# Patient Record
Sex: MALE | Race: WHITE | NOT HISPANIC OR LATINO | Employment: OTHER | URBAN - METROPOLITAN AREA
[De-identification: names, ages, dates, MRNs, and addresses within clinical notes are randomized per-mention and may not be internally consistent; named-entity substitution may affect disease eponyms.]

---

## 2017-12-27 ENCOUNTER — ESTABLISHED COMPREHENSIVE EXAM (OUTPATIENT)
Dept: URBAN - METROPOLITAN AREA CLINIC 46 | Facility: CLINIC | Age: 52
End: 2017-12-27

## 2017-12-27 DIAGNOSIS — H52.7: ICD-10-CM

## 2017-12-27 DIAGNOSIS — Z79.4: ICD-10-CM

## 2017-12-27 DIAGNOSIS — E11.9: ICD-10-CM

## 2017-12-27 PROCEDURE — 92014 COMPRE OPH EXAM EST PT 1/>: CPT

## 2017-12-27 PROCEDURE — 92015 DETERMINE REFRACTIVE STATE: CPT

## 2017-12-27 ASSESSMENT — VISUAL ACUITY
OS_CC: J3
OS_SC: J3
OD_CC: 20/20-2
OD_SC: J5
OD_CC: J2
OD_SC: 20/25+2
OS_SC: 20/20-1
OS_CC: 20/20-2

## 2017-12-27 ASSESSMENT — TONOMETRY
OD_IOP_MMHG: 18
OS_IOP_MMHG: 19

## 2018-12-19 ENCOUNTER — ESTABLISHED COMPREHENSIVE EXAM (OUTPATIENT)
Dept: URBAN - METROPOLITAN AREA CLINIC 46 | Facility: CLINIC | Age: 53
End: 2018-12-19

## 2018-12-19 ENCOUNTER — PREPPED CHART (OUTPATIENT)
Dept: URBAN - METROPOLITAN AREA CLINIC 46 | Facility: CLINIC | Age: 53
End: 2018-12-19

## 2018-12-19 DIAGNOSIS — E11.9: ICD-10-CM

## 2018-12-19 DIAGNOSIS — H52.7: ICD-10-CM

## 2018-12-19 PROCEDURE — 92015 DETERMINE REFRACTIVE STATE: CPT

## 2018-12-19 PROCEDURE — 92014 COMPRE OPH EXAM EST PT 1/>: CPT

## 2018-12-19 ASSESSMENT — VISUAL ACUITY
OD_SC: 20/20-1
OD_CC: 20/20
OS_SC: 20/20
OD_CC: J1
OS_CC: 20/20
OS_SC: J5
OS_CC: J2
OD_SC: J3

## 2018-12-19 ASSESSMENT — TONOMETRY
OD_IOP_MMHG: 17
OS_IOP_MMHG: 19

## 2019-08-19 ENCOUNTER — ESTABLISHED COMPREHENSIVE EXAM (OUTPATIENT)
Dept: URBAN - METROPOLITAN AREA CLINIC 46 | Facility: CLINIC | Age: 54
End: 2019-08-19

## 2019-08-19 DIAGNOSIS — H52.7: ICD-10-CM

## 2019-08-19 DIAGNOSIS — H40.033: ICD-10-CM

## 2019-08-19 DIAGNOSIS — Z79.4: ICD-10-CM

## 2019-08-19 DIAGNOSIS — E11.3293: ICD-10-CM

## 2019-08-19 PROCEDURE — 92015 DETERMINE REFRACTIVE STATE: CPT

## 2019-08-19 PROCEDURE — 92014 COMPRE OPH EXAM EST PT 1/>: CPT

## 2019-08-19 PROCEDURE — 92132 CPTRZD OPH DX IMG ANT SGM: CPT

## 2019-08-19 ASSESSMENT — VISUAL ACUITY
OS_CC: J2
OS_CC: 20/20-1
OD_CC: 20/20-1
OD_CC: J1+

## 2019-08-19 ASSESSMENT — TONOMETRY
OS_IOP_MMHG: 16
OD_IOP_MMHG: 14

## 2020-07-20 ENCOUNTER — ESTABLISHED COMPREHENSIVE EXAM (OUTPATIENT)
Dept: URBAN - METROPOLITAN AREA CLINIC 46 | Facility: CLINIC | Age: 55
End: 2020-07-20

## 2020-07-20 DIAGNOSIS — Z79.4: ICD-10-CM

## 2020-07-20 DIAGNOSIS — H40.033: ICD-10-CM

## 2020-07-20 DIAGNOSIS — E11.3293: ICD-10-CM

## 2020-07-20 DIAGNOSIS — H52.7: ICD-10-CM

## 2020-07-20 PROCEDURE — 92132 CPTRZD OPH DX IMG ANT SGM: CPT

## 2020-07-20 PROCEDURE — 92015 DETERMINE REFRACTIVE STATE: CPT

## 2020-07-20 PROCEDURE — 92014 COMPRE OPH EXAM EST PT 1/>: CPT

## 2020-07-20 ASSESSMENT — VISUAL ACUITY
OS_CC: 20/20 BLURRY
OS_CC: J2
OD_CC: J1
OD_CC: 20/20-1

## 2020-07-20 ASSESSMENT — TONOMETRY
OS_IOP_MMHG: 15
OD_IOP_MMHG: 15

## 2021-07-30 ENCOUNTER — ESTABLISHED COMPREHENSIVE EXAM (OUTPATIENT)
Dept: URBAN - METROPOLITAN AREA CLINIC 47 | Facility: CLINIC | Age: 56
End: 2021-07-30

## 2021-07-30 DIAGNOSIS — Z79.4: ICD-10-CM

## 2021-07-30 DIAGNOSIS — H40.033: ICD-10-CM

## 2021-07-30 DIAGNOSIS — H52.7: ICD-10-CM

## 2021-07-30 DIAGNOSIS — E11.3293: ICD-10-CM

## 2021-07-30 DIAGNOSIS — H50.51: ICD-10-CM

## 2021-07-30 PROCEDURE — 92132 CPTRZD OPH DX IMG ANT SGM: CPT

## 2021-07-30 PROCEDURE — 92014 COMPRE OPH EXAM EST PT 1/>: CPT

## 2021-07-30 PROCEDURE — 92015 DETERMINE REFRACTIVE STATE: CPT

## 2021-07-30 ASSESSMENT — VISUAL ACUITY
OS_CC: 20/20
OS_CC: J3
OD_SC: 20/20
OD_CC: J1
OS_SC: 20/20
OD_CC: 20/20

## 2021-07-30 ASSESSMENT — TONOMETRY
OD_IOP_MMHG: 15
OS_IOP_MMHG: 16

## 2022-08-17 ENCOUNTER — COMPREHENSIVE EXAM (OUTPATIENT)
Dept: URBAN - METROPOLITAN AREA CLINIC 46 | Facility: CLINIC | Age: 57
End: 2022-08-17

## 2022-08-17 DIAGNOSIS — H50.51: ICD-10-CM

## 2022-08-17 DIAGNOSIS — H52.7: ICD-10-CM

## 2022-08-17 DIAGNOSIS — Z79.4: ICD-10-CM

## 2022-08-17 DIAGNOSIS — H40.033: ICD-10-CM

## 2022-08-17 DIAGNOSIS — H35.371: ICD-10-CM

## 2022-08-17 DIAGNOSIS — H25.813: ICD-10-CM

## 2022-08-17 DIAGNOSIS — E11.3392: ICD-10-CM

## 2022-08-17 DIAGNOSIS — E11.3291: ICD-10-CM

## 2022-08-17 PROCEDURE — 92014 COMPRE OPH EXAM EST PT 1/>: CPT

## 2022-08-17 PROCEDURE — 92015 DETERMINE REFRACTIVE STATE: CPT

## 2022-08-17 PROCEDURE — 92132 CPTRZD OPH DX IMG ANT SGM: CPT

## 2022-08-17 PROCEDURE — 92134 CPTRZ OPH DX IMG PST SGM RTA: CPT

## 2022-08-17 ASSESSMENT — TONOMETRY
OS_IOP_MMHG: 18
OD_IOP_MMHG: 18

## 2022-08-17 ASSESSMENT — VISUAL ACUITY
OD_SC: 20/25-2
OS_CC: 20/25
OS_SC: J5
OD_CC: 20/20-1
OD_SC: J5
OS_CC: J3
OD_CC: J1
OS_SC: 20/25

## 2023-01-09 ENCOUNTER — FOLLOW UP (OUTPATIENT)
Dept: URBAN - METROPOLITAN AREA CLINIC 46 | Facility: CLINIC | Age: 58
End: 2023-01-09

## 2023-01-09 DIAGNOSIS — H35.371: ICD-10-CM

## 2023-01-09 DIAGNOSIS — E11.3291: ICD-10-CM

## 2023-01-09 DIAGNOSIS — E11.3392: ICD-10-CM

## 2023-01-09 DIAGNOSIS — H25.813: ICD-10-CM

## 2023-01-09 DIAGNOSIS — H40.033: ICD-10-CM

## 2023-01-09 DIAGNOSIS — H52.7: ICD-10-CM

## 2023-01-09 DIAGNOSIS — Z79.4: ICD-10-CM

## 2023-01-09 PROCEDURE — 92020 GONIOSCOPY: CPT

## 2023-01-09 PROCEDURE — 92012 INTRM OPH EXAM EST PATIENT: CPT

## 2023-01-09 ASSESSMENT — VISUAL ACUITY
OD_SC: 20/20-1
OS_SC: J5
OD_SC: J5
OS_SC: 20/20-1

## 2024-03-18 ENCOUNTER — COMPREHENSIVE EXAM (OUTPATIENT)
Dept: URBAN - METROPOLITAN AREA CLINIC 46 | Facility: CLINIC | Age: 59
End: 2024-03-18

## 2024-03-18 DIAGNOSIS — H40.033: ICD-10-CM

## 2024-03-18 DIAGNOSIS — E11.3393: ICD-10-CM

## 2024-03-18 DIAGNOSIS — H40.013: ICD-10-CM

## 2024-03-18 DIAGNOSIS — H52.7: ICD-10-CM

## 2024-03-18 DIAGNOSIS — H25.813: ICD-10-CM

## 2024-03-18 DIAGNOSIS — Z79.4: ICD-10-CM

## 2024-03-18 DIAGNOSIS — H35.371: ICD-10-CM

## 2024-03-18 PROCEDURE — 92014 COMPRE OPH EXAM EST PT 1/>: CPT

## 2024-03-18 PROCEDURE — 92015 DETERMINE REFRACTIVE STATE: CPT

## 2024-03-18 ASSESSMENT — TONOMETRY
OD_IOP_MMHG: 16
OS_IOP_MMHG: 15

## 2024-03-18 ASSESSMENT — VISUAL ACUITY
OD_SC: J6
OD_SC: 20/15
OU_SC: 20/15
OU_SC: J3
OU_CC: J1+
OD_CC: J1+
OS_SC: 20/20-1
OS_CC: J1+
OS_SC: J6

## 2024-12-11 ENCOUNTER — COMPREHENSIVE EXAM (OUTPATIENT)
Age: 59
End: 2024-12-11

## 2024-12-11 DIAGNOSIS — H40.013: ICD-10-CM

## 2024-12-11 DIAGNOSIS — H35.371: ICD-10-CM

## 2024-12-11 DIAGNOSIS — H25.813: ICD-10-CM

## 2024-12-11 DIAGNOSIS — Z79.4: ICD-10-CM

## 2024-12-11 DIAGNOSIS — H52.7: ICD-10-CM

## 2024-12-11 DIAGNOSIS — E11.3393: ICD-10-CM

## 2024-12-11 DIAGNOSIS — H40.033: ICD-10-CM

## 2024-12-11 PROCEDURE — 92134 CPTRZ OPH DX IMG PST SGM RTA: CPT

## 2024-12-11 PROCEDURE — 92014 COMPRE OPH EXAM EST PT 1/>: CPT

## 2025-01-04 NOTE — PATIENT DISCUSSION
12/20/2017OSplano+[id number]+[id number]/50 -2&nbsp;SN &nbsp; &nbsp; mnp
AMD (DRY), OU__:  PRESCRIBE AREDS 2 VITAMINS / AMSLER GRID QD/ UV PROTECTION. SMOKING CESSATION EMPHASIZED. RETURN FOR FOLLOW-UP AS SCHEDULED.
AMD (Dry) Counseling:  I have instructed the patient to take an AREDS 2 vitamin mixture to minimize the risk of disease progression. The importance of daily monitoring with Amsler grid was emphasized and an Amsler grid was provided if the patient did not have one. Return for follow-up as scheduled.
Bifocal - Daily wearOD-0.25+1.0015+[id number]/30 -2&nbsp;SN &nbsp; &nbsp; mnp
COUNSELING:
Continue: Artificial Tears (dextran 70-hypromellose): drops: 0.1-0.3%
General:
Glasses Prescribed:
Lens Material:
Lens Treatment:
Medications:
POSTERIOR VITREOUS DETACHMENT, OU__:  NO HOLES. NO TEARS. RETURN FOR FOLLOW-UP AS SCHEDULED.
Posterior Vitreous Detachment (PVD) Counseling: I have discussed the diagnosis of PVD with the patient and the possibility of a retinal tear or detachment. The signs/symptoms of a retinal tear/detachment were reviewed to include but not limited to pain, increase or change in flashes of light, increase or change in floaters, decreased vision, part of the vision missing, veils or any other ocular concerns. The patient was instructed to contact us immediately if they noticed any of the signs or symptoms of retinal detachment as noted above as the prognosis for any potential treatment options may be time related. Return for follow-up as scheduled.
Slab Off:No
UV Protection
Vertex Distance:
spherecylinderaxisaddprismvertexVAInt VANVExaminer
Hyponatremia

## 2025-06-13 ENCOUNTER — FOLLOW UP (OUTPATIENT)
Age: 60
End: 2025-06-13

## 2025-06-13 DIAGNOSIS — E11.3393: ICD-10-CM

## 2025-06-13 DIAGNOSIS — Z79.4: ICD-10-CM

## 2025-06-13 DIAGNOSIS — H40.013: ICD-10-CM

## 2025-06-13 DIAGNOSIS — H35.371: ICD-10-CM

## 2025-06-13 DIAGNOSIS — H25.813: ICD-10-CM

## 2025-06-13 DIAGNOSIS — H40.033: ICD-10-CM

## 2025-06-13 DIAGNOSIS — H52.7: ICD-10-CM

## 2025-06-13 PROCEDURE — 92134 CPTRZ OPH DX IMG PST SGM RTA: CPT

## 2025-06-13 PROCEDURE — 92012 INTRM OPH EXAM EST PATIENT: CPT

## 2025-06-27 ENCOUNTER — TECH ONLY (OUTPATIENT)
Age: 60
End: 2025-06-27

## 2025-06-27 DIAGNOSIS — H40.013: ICD-10-CM

## 2025-06-27 DIAGNOSIS — E11.3393: ICD-10-CM

## 2025-06-27 DIAGNOSIS — H40.033: ICD-10-CM

## 2025-06-27 DIAGNOSIS — Z79.4: ICD-10-CM

## 2025-06-27 DIAGNOSIS — H35.371: ICD-10-CM

## 2025-06-27 DIAGNOSIS — H25.813: ICD-10-CM

## 2025-06-27 PROCEDURE — 92134 CPTRZ OPH DX IMG PST SGM RTA: CPT

## 2025-06-27 PROCEDURE — 99211T TECH SERVICE
